# Patient Record
Sex: FEMALE | Race: WHITE | Employment: OTHER | URBAN - METROPOLITAN AREA
[De-identification: names, ages, dates, MRNs, and addresses within clinical notes are randomized per-mention and may not be internally consistent; named-entity substitution may affect disease eponyms.]

---

## 2017-11-15 NOTE — PROGRESS NOTES
and breath sounds normal. No respiratory distress. She has no wheezes. She has no rales. She exhibits no tenderness. Abdominal: Soft. Bowel sounds are normal. She exhibits no distension. There is no tenderness. There is no rebound and no guarding. Genitourinary: Vagina normal and uterus normal. No vaginal discharge found. Musculoskeletal: Normal range of motion. She exhibits no edema or tenderness. Neurological: She is alert and oriented to person, place, and time. Skin: Skin is warm and dry. No rash noted. No erythema. No pallor. Psychiatric: She has a normal mood and affect.  Her behavior is normal. Judgment and thought content normal.       Assessment:    well adult exam with pap   Screening mammogram         Plan:    Collect pap   BSE reviewed  Mammogram  Declines   Osteoporosis prevention reviewed   Dexascan declines   Preventive Health through PCP   RV prn/annual

## 2017-11-20 ENCOUNTER — OFFICE VISIT (OUTPATIENT)
Dept: OBGYN CLINIC | Age: 67
End: 2017-11-20
Payer: COMMERCIAL

## 2017-11-20 ENCOUNTER — HOSPITAL ENCOUNTER (OUTPATIENT)
Age: 67
Setting detail: SPECIMEN
Discharge: HOME | End: 2017-11-20
Payer: COMMERCIAL

## 2017-11-20 VITALS
WEIGHT: 134.25 LBS | BODY MASS INDEX: 21.57 KG/M2 | DIASTOLIC BLOOD PRESSURE: 64 MMHG | RESPIRATION RATE: 16 BRPM | SYSTOLIC BLOOD PRESSURE: 114 MMHG | HEIGHT: 66 IN

## 2017-11-20 DIAGNOSIS — Z01.419 PAP SMEAR, AS PART OF ROUTINE GYNECOLOGICAL EXAMINATION: Primary | ICD-10-CM

## 2017-11-20 PROCEDURE — 99396 PREV VISIT EST AGE 40-64: CPT | Performed by: NURSE PRACTITIONER

## 2017-11-20 RX ORDER — DIAZEPAM 2 MG/1
2 TABLET ORAL NIGHTLY PRN
Qty: 30 TABLET | Refills: 0 | Status: SHIPPED | OUTPATIENT
Start: 2017-11-20 | End: 2017-11-30

## 2017-11-20 ASSESSMENT — ENCOUNTER SYMPTOMS
DIARRHEA: 0
COUGH: 0
STRIDOR: 0
TROUBLE SWALLOWING: 0
ABDOMINAL PAIN: 0
BACK PAIN: 0
SINUS PRESSURE: 0
PHOTOPHOBIA: 0
VOMITING: 0
SHORTNESS OF BREATH: 0
SORE THROAT: 0
VOICE CHANGE: 0
NAUSEA: 0
CONSTIPATION: 0
ABDOMINAL DISTENTION: 0

## 2017-11-22 LAB
HPV SAMPLE: NORMAL
HPV SOURCE: NORMAL
HPV, GENOTYPE 16: NOT DETECTED
HPV, GENOTYPE 18: NOT DETECTED
HPV, HIGH RISK OTHER: NOT DETECTED
HPV, INTERPRETATION: NORMAL

## 2017-11-27 LAB — CYTOLOGY REPORT: NORMAL

## 2018-12-04 ENCOUNTER — HOSPITAL ENCOUNTER (OUTPATIENT)
Age: 68
Setting detail: SPECIMEN
Discharge: HOME | End: 2018-12-04
Payer: COMMERCIAL

## 2018-12-04 ENCOUNTER — OFFICE VISIT (OUTPATIENT)
Dept: OBGYN CLINIC | Age: 68
End: 2018-12-04
Payer: MEDICARE

## 2018-12-04 VITALS
RESPIRATION RATE: 16 BRPM | HEIGHT: 66 IN | WEIGHT: 134 LBS | BODY MASS INDEX: 21.53 KG/M2 | SYSTOLIC BLOOD PRESSURE: 124 MMHG | DIASTOLIC BLOOD PRESSURE: 68 MMHG

## 2018-12-04 DIAGNOSIS — F41.9 ANXIETY: ICD-10-CM

## 2018-12-04 DIAGNOSIS — Z01.419 PAP SMEAR, AS PART OF ROUTINE GYNECOLOGICAL EXAMINATION: Primary | ICD-10-CM

## 2018-12-04 PROCEDURE — G8484 FLU IMMUNIZE NO ADMIN: HCPCS | Performed by: NURSE PRACTITIONER

## 2018-12-04 PROCEDURE — 99213 OFFICE O/P EST LOW 20 MIN: CPT | Performed by: NURSE PRACTITIONER

## 2018-12-04 RX ORDER — INFLUENZA VACCINE, ADJUVANTED 15; 15; 15 UG/.5ML; UG/.5ML; UG/.5ML
INJECTION, SUSPENSION INTRAMUSCULAR
Refills: 0 | COMMUNITY
Start: 2018-09-29

## 2018-12-04 RX ORDER — DIAZEPAM 5 MG/1
TABLET ORAL
Qty: 30 TABLET | Refills: 0 | Status: SHIPPED | OUTPATIENT
Start: 2018-12-04 | End: 2019-12-04

## 2018-12-04 ASSESSMENT — PATIENT HEALTH QUESTIONNAIRE - PHQ9
1. LITTLE INTEREST OR PLEASURE IN DOING THINGS: 0
SUM OF ALL RESPONSES TO PHQ QUESTIONS 1-9: 0
SUM OF ALL RESPONSES TO PHQ9 QUESTIONS 1 & 2: 0
2. FEELING DOWN, DEPRESSED OR HOPELESS: 0
SUM OF ALL RESPONSES TO PHQ QUESTIONS 1-9: 0

## 2018-12-04 ASSESSMENT — ENCOUNTER SYMPTOMS
PHOTOPHOBIA: 0
VOMITING: 0
COLOR CHANGE: 0
STRIDOR: 0
WHEEZING: 0
TROUBLE SWALLOWING: 0
ABDOMINAL PAIN: 0
DIARRHEA: 0
COUGH: 0
BACK PAIN: 0
SHORTNESS OF BREATH: 0
ABDOMINAL DISTENTION: 0
SORE THROAT: 0
CONSTIPATION: 0
NAUSEA: 0

## 2018-12-24 LAB — CYTOLOGY REPORT: NORMAL

## 2022-08-25 NOTE — DISCHARGE INSTRUCTIONS
You have had lesions removed today under local anesthesia. You may have some pain at the surgery site after the procedure. You should avoid aspirin products and over the counter products  for 3 days. Keep areas clean and dry. No strenuous activity for 24  HOURS     No swimming, no tub baths,no hot tubs    Eat lightly at first, advance diet as tolerated. Drink plenty of fluids. Keep it covered with a sterile bandage until follow up appointment     Steri-strips  will fall off on their own in about a week. You may shower 24 hours after the procedure. May use shampoo . Gently wash over scalp incision    Pat the wound dry after you have washed it with a mild soap. Do not submerge the wound in water until it is well-healed. Take pain medication as directed, if necessary per instructions. Complete ALL antibiotics as directed for entire duration of therapy. Stitches will be left in the skin until your follow up appointment.      Call Your Doctor if any of the following occurs:     Signs of infection, including fever and chills   Redness, swelling, increasing pain, excessive bleeding, or discharge from the incision site   Pain that you cannot control with the medicines you have been given   Any new symptoms

## 2022-08-31 ENCOUNTER — HOSPITAL ENCOUNTER (OUTPATIENT)
Age: 72
Setting detail: OUTPATIENT SURGERY
Discharge: HOME | End: 2022-08-31
Attending: PLASTIC SURGERY | Admitting: PLASTIC SURGERY
Payer: MEDICARE

## 2022-08-31 VITALS
SYSTOLIC BLOOD PRESSURE: 115 MMHG | BODY MASS INDEX: 21.49 KG/M2 | WEIGHT: 129 LBS | DIASTOLIC BLOOD PRESSURE: 80 MMHG | HEART RATE: 53 BPM | TEMPERATURE: 98.2 F | HEIGHT: 65 IN | OXYGEN SATURATION: 99 %

## 2022-08-31 DIAGNOSIS — R22.2 MASS OF SKIN OF CHEST: ICD-10-CM

## 2022-08-31 DIAGNOSIS — R22.0 SCALP MASS: ICD-10-CM

## 2022-08-31 DIAGNOSIS — R22.2 MASS OF SKIN OF ABDOMEN: ICD-10-CM

## 2022-08-31 PROCEDURE — 3600000012 HC SURGERY LEVEL 2 ADDTL 15MIN: Performed by: PLASTIC SURGERY

## 2022-08-31 PROCEDURE — 7100000010 HC PHASE II RECOVERY - FIRST 15 MIN: Performed by: PLASTIC SURGERY

## 2022-08-31 PROCEDURE — 2709999900 HC NON-CHARGEABLE SUPPLY: Performed by: PLASTIC SURGERY

## 2022-08-31 PROCEDURE — 2500000003 HC RX 250 WO HCPCS: Performed by: PLASTIC SURGERY

## 2022-08-31 PROCEDURE — 3600000002 HC SURGERY LEVEL 2 BASE: Performed by: PLASTIC SURGERY

## 2022-08-31 PROCEDURE — 88304 TISSUE EXAM BY PATHOLOGIST: CPT

## 2022-08-31 PROCEDURE — 7100000011 HC PHASE II RECOVERY - ADDTL 15 MIN: Performed by: PLASTIC SURGERY

## 2022-08-31 RX ORDER — LIDOCAINE HYDROCHLORIDE AND EPINEPHRINE 10; 10 MG/ML; UG/ML
INJECTION, SOLUTION INFILTRATION; PERINEURAL
Status: DISCONTINUED
Start: 2022-08-31 | End: 2022-08-31 | Stop reason: HOSPADM

## 2022-08-31 ASSESSMENT — PAIN - FUNCTIONAL ASSESSMENT: PAIN_FUNCTIONAL_ASSESSMENT: 0-10

## 2022-08-31 NOTE — BRIEF OP NOTE
Brief Postoperative Note      Patient: Danii Sainz  YOB: 1950  MRN: [id number]    Date of Procedure: 8/31/2022    Pre-Op Diagnosis: MASS - SCALP, CHEST AND LEFT ABDOMEN R22.2    Post-Op Diagnosis: Same       Procedure(s):  SCALP, CHEST AND LEFT ABDOMEN LESION  BIOPSY EXCISION    Surgeon(s):  Justina Lauren MD    Assistant:  * No surgical staff found *    Anesthesia: Local    Estimated Blood Loss (mL): Minimal    Complications: None    Specimens:   ID Type Source Tests Collected by Time Destination   A : Scalp lesion Tissue Tissue SURGICAL PATHOLOGY Justina Lauren MD 8/31/2022 1251    B : chest lesion Tissue Tissue SURGICAL PATHOLOGY Justina Lauren MD 8/31/2022 1252    C : left abdomen lesion Tissue Tissue SURGICAL PATHOLOGY Justina Lauren MD 8/31/2022 1252        Implants:  * No implants in log *      Drains: * No LDAs found *    Findings:     Electronically signed by Justina Lauren MD on 8/31/2022 at 1:17 PM

## 2022-08-31 NOTE — H&P
Subjective:      Patient ID: Nita Lugo is a 73 y.o. female. HPI  Patient is here today for cysts on the scalp, one on the chest and one on the left side of the abdomen. Medical History    Medical History    No past medical history on file. Other Medical History    Diagnosis Date Comment Source   Tobacco abuse           Family History    No family history on file. Social History    Tobacco History    Smoking Status   Former Smoking Tobacco Type   Cigarettes   Smokeless Tobacco Use   Never     Alcohol History    Alcohol Use Status   Yes     Drug Use    Drug Use Status   No     Sexual Activity    Sexually Active   Not Asked    Surgical History    No past surgical history on file. E-cigarette/Vaping    Questions Responses   E-cigarette/Vaping Use    Start Date    Passive Exposure    Quit Date    Counseling Given    Comments      Socioeconomic History    Employment History    No employment history on file. Family and Education    Marital Status        Social Identity    Preferred Language Ethnicity Race   English Non- / Non  White (non-)           No current facility-administered medications on file prior to encounter. Current Outpatient Medications on File Prior to Encounter   Medication Sig Dispense Refill    cephALEXin (KEFLEX) 250 MG capsule Take one PO QID. Start day before surgery. 12 capsule 0    FLUAD 0.5 ML NIGEL inject 0.5 milliliter intramuscularly  0    vitamin D (ERGOCALCIFEROL) 53870 UNITS CAPS capsule Take 50,000 Units by mouth once a week. calcium carbonate 600 MG TABS tablet Take 1 tablet by mouth daily. Objective:   Physical Exam  Vitals and nursing note reviewed. Exam conducted with a chaperone present. Constitutional:       Appearance: Normal appearance. HENT:      Head: Normocephalic and atraumatic.       Mouth/Throat:      Mouth: Mucous membranes are moist.   Eyes:      Extraocular Movements: Extraocular movements intact. Conjunctiva/sclera: Conjunctivae normal.      Pupils: Pupils are equal, round, and reactive to light. Pulmonary:      Effort: Pulmonary effort is normal.   Chest:      Abdominal:      Skin:     General: Skin is warm and dry. Neurological:      General: No focal deficit present. Mental Status: She is alert and oriented to person, place, and time. Assessment:   Mass x3. Plan:   Scheduled for excision x3. I have discussed with the patient the indication, alternatives, and the possible risks and/or complications which include but are not limited to those delineated on the consent form for the planned procedure and the anesthesia methods. All questions were answered to patient's satisfaction. Consent was reviewed and signed.

## 2022-09-02 LAB — DERMATOLOGY PATHOLOGY REPORT: NORMAL

## 2022-09-04 NOTE — OP NOTE
for  hemostasis. Wounds closed in layers with interrupted 4-0 Vicryl in the  subcutaneous and running intracuticular 4-0 Prolene to the skin of the  chest and the abdomen. For the scalp, 3-0 Vicryl was used and  3-0 Prolene. The patient tolerated procedure well. Blood loss minimal.  Specimens x3. Complications none. The patient was transferred to  recovery in stable condition.       Delia Perez    D: 09/03/2022 20:29:16       T: 09/03/2022 20:31:36     [id number]  Job#: [id number]     Doc#: [id number]    CC:

## 2024-01-15 ENCOUNTER — OFFICE VISIT (OUTPATIENT)
Dept: PODIATRY | Age: 74
End: 2024-01-15
Payer: MEDICARE

## 2024-01-15 VITALS — WEIGHT: 129 LBS | BODY MASS INDEX: 21.49 KG/M2 | HEIGHT: 65 IN

## 2024-01-15 DIAGNOSIS — M79.671 RIGHT FOOT PAIN: Primary | ICD-10-CM

## 2024-01-15 PROCEDURE — 99204 OFFICE O/P NEW MOD 45 MIN: CPT | Performed by: PODIATRIST

## 2024-01-15 PROCEDURE — G8399 PT W/DXA RESULTS DOCUMENT: HCPCS | Performed by: PODIATRIST

## 2024-01-15 PROCEDURE — 3017F COLORECTAL CA SCREEN DOC REV: CPT | Performed by: PODIATRIST

## 2024-01-15 PROCEDURE — 1036F TOBACCO NON-USER: CPT | Performed by: PODIATRIST

## 2024-01-15 PROCEDURE — G8427 DOCREV CUR MEDS BY ELIG CLIN: HCPCS | Performed by: PODIATRIST

## 2024-01-15 PROCEDURE — 1090F PRES/ABSN URINE INCON ASSESS: CPT | Performed by: PODIATRIST

## 2024-01-15 PROCEDURE — G8420 CALC BMI NORM PARAMETERS: HCPCS | Performed by: PODIATRIST

## 2024-01-15 PROCEDURE — G8484 FLU IMMUNIZE NO ADMIN: HCPCS | Performed by: PODIATRIST

## 2024-01-15 PROCEDURE — 1123F ACP DISCUSS/DSCN MKR DOCD: CPT | Performed by: PODIATRIST

## 2024-01-15 NOTE — PROGRESS NOTES
[facility] PHYSICIANS [facility] PODIATRY  [address] [place], [place] [address]  Dept: [phone]    NEW PATIENT PROGRESS NOTE  Date of patient's visit: 1/15/2024  Patient's Name:  Milena Garza YOB: 1950            Patient Care Team:  John Guerra MD as PCP - General (Family Medicine)  Sameera Howard APRN - CNP as Nurse Practitioner (Nurse Practitioner)        Chief Complaint   Patient presents with    New Patient     Establish care    Bunions     Right foot x 1 year         HPI:   Milena Garza is a 73 y.o. female who presents to the office today complaining of right foot bunion.  Symptoms began 1 year(s) ago. Patient relates pain is Absent .  Pain is rated 0 out of 10 and is described as none.  Treatments prior to today's visit include: new shoes that helped.  She doesn't want the bunion to get worse but it does not cause her much pain unless she is in the wrong shoes .  Currently denies F/C/N/V. Pt's primary care physician is John Guerra MD last seen November 9 2023     Allergies   Allergen Reactions    Other      Bee Stings, Nickel        Past Medical History:   Diagnosis Date    Tobacco abuse        Prior to Admission medications    Medication Sig Start Date End Date Taking? Authorizing Provider   cephALEXin (KEFLEX) 250 MG capsule Take one PO QID. Start day before surgery. 8/18/22  Yes Mariela Son MD   vitamin D (ERGOCALCIFEROL) 99077 UNITS CAPS capsule Take 1 capsule by mouth once a week   Yes Kristine Santos MD   calcium carbonate 600 MG TABS tablet Take 1 tablet by mouth daily   Yes Kristine Santos MD   FLUAD 0.5 ML NIGEL inject 0.5 milliliter intramuscularly  Patient not taking: Reported on 8/31/2022 9/29/18   Kristine Santos MD       Past Surgical History:   Procedure Laterality Date    PRE-MALIGNANT / BENIGN SKIN LESION EXCISION  08/31/2022    SKIN BIOPSY N/A 8/31/2022    SCALP, CHEST AND LEFT ABDOMEN

## 2024-02-12 ENCOUNTER — OFFICE VISIT (OUTPATIENT)
Dept: PODIATRY | Age: 74
End: 2024-02-12
Payer: MEDICARE

## 2024-02-12 VITALS — BODY MASS INDEX: 21.49 KG/M2 | HEIGHT: 65 IN | WEIGHT: 129 LBS

## 2024-02-12 DIAGNOSIS — M79.671 RIGHT FOOT PAIN: Primary | ICD-10-CM

## 2024-02-12 PROCEDURE — G8484 FLU IMMUNIZE NO ADMIN: HCPCS | Performed by: PODIATRIST

## 2024-02-12 PROCEDURE — 1036F TOBACCO NON-USER: CPT | Performed by: PODIATRIST

## 2024-02-12 PROCEDURE — 1123F ACP DISCUSS/DSCN MKR DOCD: CPT | Performed by: PODIATRIST

## 2024-02-12 PROCEDURE — G8427 DOCREV CUR MEDS BY ELIG CLIN: HCPCS | Performed by: PODIATRIST

## 2024-02-12 PROCEDURE — G8399 PT W/DXA RESULTS DOCUMENT: HCPCS | Performed by: PODIATRIST

## 2024-02-12 PROCEDURE — 99213 OFFICE O/P EST LOW 20 MIN: CPT | Performed by: PODIATRIST

## 2024-02-12 PROCEDURE — 1090F PRES/ABSN URINE INCON ASSESS: CPT | Performed by: PODIATRIST

## 2024-02-12 PROCEDURE — 3017F COLORECTAL CA SCREEN DOC REV: CPT | Performed by: PODIATRIST

## 2024-02-12 PROCEDURE — G8420 CALC BMI NORM PARAMETERS: HCPCS | Performed by: PODIATRIST

## 2024-02-12 NOTE — PROGRESS NOTES
[facility] PHYSICIANS [facility] PODIATRY  [address] [place], [place] [address]  Dept: [phone]    RETURN PATIENT PROGRESS NOTE  Date of patient's visit: 2/12/2024  Patient's Name:  Milena Garza YOB: 1950            Patient Care Team:  John Guerra MD as PCP - General (Family Medicine)  Sameera Howard APRN - CNP as Nurse Practitioner (Nurse Practitioner)       Milena Garza 73 y.o. female that presents for follow-up of   Chief Complaint   Patient presents with    Bunions     Right foot     Pt's primary care physician is John Guerra MD last seen November 9 2023  Symptoms began 1 year(s) ago and are decreased .  Patient relates pain is Absent .  Pain is rated 0 out of 10 and is described as none.  Treatments prior to today's visit include: previous podiatry treatment.  Currently denies F/C/N/V.     Allergies   Allergen Reactions    Other      Bee Stings, Nickel        Past Medical History:   Diagnosis Date    Tobacco abuse        Prior to Admission medications    Medication Sig Start Date End Date Taking? Authorizing Provider   cephALEXin (KEFLEX) 250 MG capsule Take one PO QID. Start day before surgery.  Patient not taking: Reported on 1/15/2024 8/18/22   Mariela Son MD   FLUAD 0.5 ML NIGEL inject 0.5 milliliter intramuscularly  Patient not taking: Reported on 8/31/2022 9/29/18   Kristine Santos MD   vitamin D (ERGOCALCIFEROL) 17762 UNITS CAPS capsule Take 1 capsule by mouth once a week    Kristine Santos MD   calcium carbonate 600 MG TABS tablet Take 1 tablet by mouth daily    Kristine Santos MD       Review of Systems    Review of Systems:  History obtained from chart review and the patient  General ROS: negative for - chills, fatigue, fever, night sweats or weight gain  Constitutional: Negative for chills, diaphoresis, fatigue, fever and unexpected weight change.  Musculoskeletal: Positive for arthralgias,

## 2024-10-03 ENCOUNTER — HOSPITAL ENCOUNTER (OUTPATIENT)
Dept: PREADMISSION TESTING | Age: 74
Discharge: HOME | End: 2024-10-07

## 2024-10-03 VITALS
HEART RATE: 71 BPM | WEIGHT: 126 LBS | SYSTOLIC BLOOD PRESSURE: 127 MMHG | HEIGHT: 63 IN | BODY MASS INDEX: 22.32 KG/M2 | DIASTOLIC BLOOD PRESSURE: 65 MMHG | RESPIRATION RATE: 16 BRPM | OXYGEN SATURATION: 95 %

## 2024-10-03 NOTE — DISCHARGE INSTRUCTIONS
DAY OF SURGERY/PROCEDURE  GUIDELINES    As a patient at the [facility], you can expect quality medical and nursing care that is centered on your individual needs. It is our goal to make your surgical experience as comfortable and excellent as possible.  ________________________________________________________________________    The following instructions are general guidelines, if any information on this sheet is different from what your doctor has instructed you to do, please follow your doctor's instructions.    Please arrive on 10/15 @ 615am      Enter through entrance C. Check in at registration     Upon arrival you will be taken to the pre-operative area to get ready for surgery, your family will stay in the waiting room and visit with you once you are ready for surgery. Due to special limitations please limit visitation to 1-2 members of your family at a time. When it is time for surgery your family will return to the waiting room.    Nothing to eat, drink, smoke, suck or chew after midnight (no water, gum, mints, cigarettes, cigars, pipes, snuff, chewing tobacco, etc.) or your surgery may be canceled.     Take a shower or bath on the morning of your surgery/procedure (Hibiclens if directed) Do not apply any lotions.    Brush your teeth, but do not swallow any water    IN CASE OF ILLNESS - If you have a cold or flu symptoms (high fever, runny nose, sore throat, cough, etc.) rash, nausea, vomiting, loose stools, and/or recent contact with someone who has a contagious disease (chick pox, measles, etc.) please call your doctor before coming to the surgery center    Take a small sip of water with heart, blood pressure, and/or seizure medication the morning of surgery.     If applicable bring your:  Inhaler (s)  Hearing aid(s)  Eyeglasses and Case (If you wear contacts they have to be removed before surgery, bring case and solution)    DO NOT take anticoagulants (blood thinners, aspirin or

## 2024-10-07 LAB
EKG ATRIAL RATE: 66 BPM
EKG P AXIS: 84 DEGREES
EKG P-R INTERVAL: 154 MS
EKG Q-T INTERVAL: 406 MS
EKG QRS DURATION: 86 MS
EKG QTC CALCULATION (BAZETT): 425 MS
EKG R AXIS: 89 DEGREES
EKG T AXIS: 80 DEGREES
EKG VENTRICULAR RATE: 66 BPM

## 2024-10-08 NOTE — DISCHARGE INSTRUCTIONS
Activity  You have had anesthesia today  Do not drive, operate heavy equipment, consume alcoholic beverages, or make any important decisions  for 24 hours   If you are taking pain medication: Do not drive or consume alcohol.  Take your time changing positions today. You may feel light headed or dizzy if you move too quickly.   Continue your home medications as ordered by your physician.  Diet   You can eat your normal diet when you feel well. You should start off with bland foods like chicken soup, toast, or yogurt. Then advance as tolerated.  Drink plenty of fluids (unless your doctor tells you not to). Your urine should be very lightly colored without a strong odor.       You may have some pain at the surgery site after the procedure.     You should avoid aspirin products and over the counter products  for 3 days.     Keep areas clean and dry.     No strenuous activity for 24  HOURS     No swimming, no tub baths,no hot tubs    Eat lightly at first, advance diet as tolerated. Drink plenty of fluids.    Keep it covered with a sterile bandage until follow up appointment     Steri-strips  will fall off on their own in about a week.    You may shower 24 hours after the procedure.    Pat the wound dry after you have washed it with a mild soap.    Do not submerge the wound in water until it is well-healed.      Take pain medication as directed, if necessary per instructions.     Complete ALL antibiotics as directed for entire duration of therapy.     Stitches will be left in the skin until your follow up appointment.     Call Your Doctor if any of the following occurs:     Signs of infection, including fever and chills   Redness, swelling, increasing pain, excessive bleeding, or discharge from the incision site   Pain that you cannot control with the medicines you have been given   Any new symptoms

## 2024-10-14 ENCOUNTER — ANESTHESIA EVENT (OUTPATIENT)
Dept: OPERATING ROOM | Age: 74
End: 2024-10-14
Payer: MEDICARE

## 2024-10-15 ENCOUNTER — HOSPITAL ENCOUNTER (OUTPATIENT)
Age: 74
Setting detail: OUTPATIENT SURGERY
Discharge: HOME | End: 2024-10-15
Attending: PLASTIC SURGERY | Admitting: PLASTIC SURGERY
Payer: MEDICARE

## 2024-10-15 ENCOUNTER — ANESTHESIA (OUTPATIENT)
Dept: OPERATING ROOM | Age: 74
End: 2024-10-15
Payer: MEDICARE

## 2024-10-15 VITALS
OXYGEN SATURATION: 94 % | HEIGHT: 63 IN | BODY MASS INDEX: 22.32 KG/M2 | SYSTOLIC BLOOD PRESSURE: 119 MMHG | RESPIRATION RATE: 13 BRPM | DIASTOLIC BLOOD PRESSURE: 69 MMHG | TEMPERATURE: 97.2 F | HEART RATE: 54 BPM | WEIGHT: 126 LBS

## 2024-10-15 DIAGNOSIS — D48.5 NEOPLASM OF UNCERTAIN BEHAVIOR OF SKIN: ICD-10-CM

## 2024-10-15 PROCEDURE — 6360000002 HC RX W HCPCS: Performed by: NURSE ANESTHETIST, CERTIFIED REGISTERED

## 2024-10-15 PROCEDURE — 88305 TISSUE EXAM BY PATHOLOGIST: CPT

## 2024-10-15 PROCEDURE — 7100000011 HC PHASE II RECOVERY - ADDTL 15 MIN: Performed by: PLASTIC SURGERY

## 2024-10-15 PROCEDURE — 7100000010 HC PHASE II RECOVERY - FIRST 15 MIN: Performed by: PLASTIC SURGERY

## 2024-10-15 PROCEDURE — 3700000001 HC ADD 15 MINUTES (ANESTHESIA): Performed by: PLASTIC SURGERY

## 2024-10-15 PROCEDURE — 7100000001 HC PACU RECOVERY - ADDTL 15 MIN: Performed by: PLASTIC SURGERY

## 2024-10-15 PROCEDURE — 2500000003 HC RX 250 WO HCPCS: Performed by: PLASTIC SURGERY

## 2024-10-15 PROCEDURE — 3700000000 HC ANESTHESIA ATTENDED CARE: Performed by: PLASTIC SURGERY

## 2024-10-15 PROCEDURE — 7100000000 HC PACU RECOVERY - FIRST 15 MIN: Performed by: PLASTIC SURGERY

## 2024-10-15 PROCEDURE — 2709999900 HC NON-CHARGEABLE SUPPLY: Performed by: PLASTIC SURGERY

## 2024-10-15 PROCEDURE — 3600000002 HC SURGERY LEVEL 2 BASE: Performed by: PLASTIC SURGERY

## 2024-10-15 PROCEDURE — 2500000003 HC RX 250 WO HCPCS: Performed by: NURSE ANESTHETIST, CERTIFIED REGISTERED

## 2024-10-15 PROCEDURE — 3600000012 HC SURGERY LEVEL 2 ADDTL 15MIN: Performed by: PLASTIC SURGERY

## 2024-10-15 RX ORDER — LABETALOL HYDROCHLORIDE 5 MG/ML
10 INJECTION, SOLUTION INTRAVENOUS
Status: DISCONTINUED | OUTPATIENT
Start: 2024-10-15 | End: 2024-10-15 | Stop reason: HOSPADM

## 2024-10-15 RX ORDER — ONDANSETRON 2 MG/ML
INJECTION INTRAMUSCULAR; INTRAVENOUS
Status: DISCONTINUED | OUTPATIENT
Start: 2024-10-15 | End: 2024-10-15 | Stop reason: SDUPTHER

## 2024-10-15 RX ORDER — BUPIVACAINE HYDROCHLORIDE AND EPINEPHRINE 5; 5 MG/ML; UG/ML
INJECTION, SOLUTION PERINEURAL
Status: DISCONTINUED
Start: 2024-10-15 | End: 2024-10-15 | Stop reason: HOSPADM

## 2024-10-15 RX ORDER — SODIUM CHLORIDE 0.9 % (FLUSH) 0.9 %
5-40 SYRINGE (ML) INJECTION PRN
Status: DISCONTINUED | OUTPATIENT
Start: 2024-10-15 | End: 2024-10-15 | Stop reason: HOSPADM

## 2024-10-15 RX ORDER — MIDAZOLAM HYDROCHLORIDE 1 MG/ML
INJECTION INTRAMUSCULAR; INTRAVENOUS
Status: DISCONTINUED | OUTPATIENT
Start: 2024-10-15 | End: 2024-10-15 | Stop reason: SDUPTHER

## 2024-10-15 RX ORDER — HYDRALAZINE HYDROCHLORIDE 20 MG/ML
10 INJECTION INTRAMUSCULAR; INTRAVENOUS
Status: DISCONTINUED | OUTPATIENT
Start: 2024-10-15 | End: 2024-10-15 | Stop reason: HOSPADM

## 2024-10-15 RX ORDER — DEXAMETHASONE SODIUM PHOSPHATE 10 MG/ML
INJECTION, SOLUTION INTRAMUSCULAR; INTRAVENOUS
Status: DISCONTINUED | OUTPATIENT
Start: 2024-10-15 | End: 2024-10-15 | Stop reason: SDUPTHER

## 2024-10-15 RX ORDER — NALOXONE HYDROCHLORIDE 0.4 MG/ML
INJECTION, SOLUTION INTRAMUSCULAR; INTRAVENOUS; SUBCUTANEOUS PRN
Status: DISCONTINUED | OUTPATIENT
Start: 2024-10-15 | End: 2024-10-15 | Stop reason: HOSPADM

## 2024-10-15 RX ORDER — SODIUM CHLORIDE, SODIUM LACTATE, POTASSIUM CHLORIDE, CALCIUM CHLORIDE 600; 310; 30; 20 MG/100ML; MG/100ML; MG/100ML; MG/100ML
INJECTION, SOLUTION INTRAVENOUS CONTINUOUS
Status: DISCONTINUED | OUTPATIENT
Start: 2024-10-15 | End: 2024-10-15 | Stop reason: HOSPADM

## 2024-10-15 RX ORDER — FENTANYL CITRATE 50 UG/ML
INJECTION, SOLUTION INTRAMUSCULAR; INTRAVENOUS
Status: DISCONTINUED | OUTPATIENT
Start: 2024-10-15 | End: 2024-10-15 | Stop reason: SDUPTHER

## 2024-10-15 RX ORDER — LIDOCAINE HYDROCHLORIDE 10 MG/ML
INJECTION, SOLUTION EPIDURAL; INFILTRATION; INTRACAUDAL; PERINEURAL
Status: DISCONTINUED | OUTPATIENT
Start: 2024-10-15 | End: 2024-10-15 | Stop reason: SDUPTHER

## 2024-10-15 RX ORDER — PROPOFOL 10 MG/ML
INJECTION, EMULSION INTRAVENOUS
Status: DISCONTINUED | OUTPATIENT
Start: 2024-10-15 | End: 2024-10-15 | Stop reason: SDUPTHER

## 2024-10-15 RX ORDER — LIDOCAINE HYDROCHLORIDE 10 MG/ML
1 INJECTION, SOLUTION EPIDURAL; INFILTRATION; INTRACAUDAL; PERINEURAL
Status: DISCONTINUED | OUTPATIENT
Start: 2024-10-15 | End: 2024-10-15 | Stop reason: HOSPADM

## 2024-10-15 RX ORDER — SODIUM CHLORIDE 0.9 % (FLUSH) 0.9 %
5-40 SYRINGE (ML) INJECTION EVERY 12 HOURS SCHEDULED
Status: DISCONTINUED | OUTPATIENT
Start: 2024-10-15 | End: 2024-10-15 | Stop reason: HOSPADM

## 2024-10-15 RX ORDER — CEPHALEXIN 500 MG/1
500 CAPSULE ORAL 3 TIMES DAILY
Qty: 15 CAPSULE | Refills: 0 | Status: SHIPPED | OUTPATIENT
Start: 2024-10-15 | End: 2024-10-20

## 2024-10-15 RX ORDER — SODIUM CHLORIDE 9 MG/ML
INJECTION, SOLUTION INTRAVENOUS PRN
Status: DISCONTINUED | OUTPATIENT
Start: 2024-10-15 | End: 2024-10-15 | Stop reason: HOSPADM

## 2024-10-15 RX ORDER — BUPIVACAINE HYDROCHLORIDE AND EPINEPHRINE 5; 5 MG/ML; UG/ML
INJECTION, SOLUTION PERINEURAL PRN
Status: DISCONTINUED | OUTPATIENT
Start: 2024-10-15 | End: 2024-10-15 | Stop reason: ALTCHOICE

## 2024-10-15 RX ORDER — PROCHLORPERAZINE EDISYLATE 5 MG/ML
5 INJECTION INTRAMUSCULAR; INTRAVENOUS
Status: DISCONTINUED | OUTPATIENT
Start: 2024-10-15 | End: 2024-10-15 | Stop reason: HOSPADM

## 2024-10-15 RX ADMIN — LIDOCAINE HYDROCHLORIDE 50 MG: 10 INJECTION, SOLUTION EPIDURAL; INFILTRATION; INTRACAUDAL; PERINEURAL at 07:35

## 2024-10-15 RX ADMIN — MIDAZOLAM 2 MG: 1 INJECTION INTRAMUSCULAR; INTRAVENOUS at 07:35

## 2024-10-15 RX ADMIN — DEXAMETHASONE SODIUM PHOSPHATE 10 MG: 10 INJECTION, SOLUTION INTRAMUSCULAR; INTRAVENOUS at 07:38

## 2024-10-15 RX ADMIN — Medication 2 G: at 07:37

## 2024-10-15 RX ADMIN — ONDANSETRON 4 MG: 2 INJECTION INTRAMUSCULAR; INTRAVENOUS at 07:41

## 2024-10-15 RX ADMIN — FENTANYL CITRATE 50 MCG: 50 INJECTION, SOLUTION INTRAMUSCULAR; INTRAVENOUS at 07:35

## 2024-10-15 RX ADMIN — PROPOFOL 150 MG: 10 INJECTION, EMULSION INTRAVENOUS at 07:35

## 2024-10-15 ASSESSMENT — PAIN - FUNCTIONAL ASSESSMENT
PAIN_FUNCTIONAL_ASSESSMENT: 0-10
PAIN_FUNCTIONAL_ASSESSMENT: NONE - DENIES PAIN

## 2024-10-15 NOTE — OP NOTE
Operative Note      Patient: Milena Garza  YOB: 1950  MRN: [id number]    Date of Procedure: 10/15/2024    Pre-Op Diagnosis Codes:      * Neoplasm of uncertain behavior of skin [D48.5]    Post-Op Diagnosis: Same       Procedure(s):  EXCISION LESION ANTERIOR NECK -4 cm lesion with 5.4 cm linear closure    Surgeon(s):  Pablito Kahn MD    Assistant:   * No surgical staff found *    Anesthesia: General    Estimated Blood Loss (mL): Minimal    Complications: None    Specimens:   ID Type Source Tests Collected by Time Destination   A : LESION ANTERIOR NECK Tissue Tissue SURGICAL PATHOLOGY Pablito Kahn MD 10/15/2024 0744        Implants:  * No implants in log *      Drains: * No LDAs found *    Findings:  Infection Present At Time Of Surgery (PATOS) (choose all levels that have infection present):  No infection present  Other Findings: Excision measured 2.5 cm x 5.1 cm measured design  This procedure was not performed to treat primary cutaneous melanoma through wide local excision    Detailed Description of Procedure:   With the patient by general anesthesia dyspnea posterior pharyngeal LMA intubation this excoriated lesion on the right paramedian anterior neck was designed for excision.  2 mm clinically free margins were marked and the ellipse was measured at 2.5 x 5.1 cm.  After sterile prep and drape and appropriate timeout this area was injected with half percent Marcaine 1-200,000 epinephrine local.  It was incised as designed with dissection into the subcutaneous tissue.  The entire lesion was removed and Bovie electrocautery hemostasis was performed.  Multilayer closure with 3-0 Monocryl was performed with excellent approximation paralleling relaxed skin tension lines.  Steri-Strips were applied.  She went to recovery in excellent condition, no complication and needle and sponge count correct at the end of the case.    Electronically signed by Pablito Kahn MD on

## 2024-10-15 NOTE — ANESTHESIA PRE PROCEDURE
Department of Anesthesiology  Preprocedure Note       Name:  Milena Garza   Age:  73 y.o.  :  1950                                          MRN:  [id number]         Date:  10/15/2024      Surgeon: Surgeon(s):  Pablito Kahn MD    Procedure: Procedure(s):  ANTERIOR NECK LESION 2cm BIOPSY EXCISION    Medications prior to admission:   Prior to Admission medications    Medication Sig Start Date End Date Taking? Authorizing Provider   aspirin-acetaminophen-caffeine (EXCEDRIN MIGRAINE) 250-250-65 MG per tablet Take 1 tablet by mouth every 6 hours as needed for Headaches    ProviderKristine MD   fluorouracil (EFUDEX) 5 % cream Apply topically 2 times daily. 24   Elizabeth Burrows, APRN - CNP   vitamin D (ERGOCALCIFEROL) 78400 UNITS CAPS capsule Take 1 capsule by mouth Twice a Week    ProviderKristine MD   calcium carbonate 600 MG TABS tablet Take 1 tablet by mouth daily    ProviderKristine MD       Current medications:    Current Facility-Administered Medications   Medication Dose Route Frequency Provider Last Rate Last Admin    ceFAZolin 2000 mg in 20 mL SWFI IV Syringe IV syringe             lidocaine PF 1 % injection 1 mL  1 mL IntraDERmal Once PRN Obi Alna MD        lactated ringers IV soln infusion   IntraVENous Continuous Obi Alan MD        sodium chloride flush 0.9 % injection 5-40 mL  5-40 mL IntraVENous 2 times per day Obi Alan MD        sodium chloride flush 0.9 % injection 5-40 mL  5-40 mL IntraVENous PRN Obi Alan MD        0.9 % sodium chloride infusion   IntraVENous PRN Obi Alan MD           Allergies:    Allergies   Allergen Reactions    Other      Bee Stings, Nickel        Problem List:    Patient Active Problem List   Diagnosis Code    Tobacco abuse Z72.0       Past Medical History:        Diagnosis Date    Tobacco abuse        Past Surgical History:        Procedure Laterality Date    OVARY REMOVAL      SKIN BIOPSY N/A 2022
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
## 2024-10-15 NOTE — ANESTHESIA POSTPROCEDURE EVALUATION
Department of Anesthesiology  Postprocedure Note    Patient: Milena Garza  MRN: [id number]  YOB: 1950  Date of evaluation: 10/15/2024    Procedure Summary       Date: 10/15/24 Room / Location: [address]    Anesthesia Start: 0732 Anesthesia Stop: 0802    Procedure: EXCISION LESION ANTERIOR NECK (Neck) Diagnosis:       Neoplasm of uncertain behavior of skin      (Neoplasm of uncertain behavior of skin [D48.5])    Surgeons: Pablito Kahn MD Responsible Provider: Obi Alan MD    Anesthesia Type: general ASA Status: 2            Anesthesia Type: No value filed.    Chely Phase I: Chely Score: 9    Chely Phase II: Chely Score: 10    Anesthesia Post Evaluation    Patient location during evaluation: PACU  Patient participation: complete - patient participated  Level of consciousness: awake and awake and alert  Pain score: 1  Nausea & Vomiting: no nausea and no vomiting  Cardiovascular status: hemodynamically stable and blood pressure returned to baseline  Respiratory status: acceptable  Hydration status: euvolemic  Multimodal analgesia pain management approach  Pain management: adequate    No notable events documented.

## 2024-10-16 LAB — SURGICAL PATHOLOGY REPORT: NORMAL

## (undated) DEVICE — SOLUTION SCRB 4OZ 7.5% POVIDONE IOD ANTIMIC BTL

## (undated) DEVICE — MHPB GEN MINOR PACK: Brand: MEDLINE INDUSTRIES, INC.

## (undated) DEVICE — SOLUTION IRRIG 1000ML 0.9% SOD CHL USP POUR PLAS BTL

## (undated) DEVICE — SUTURE PROL SZ 3-0 L18IN NONABSORBABLE BLU L24MM FS-1 3/8 8684G

## (undated) DEVICE — PREMIUM DRY TRAY LF: Brand: MEDLINE INDUSTRIES, INC.

## (undated) DEVICE — SUTURE PROL SZ 4-0 L18IN NONABSORBABLE BLU L16MM PC-3 3/8 8634G

## (undated) DEVICE — GLOVE ORANGE PI 7 1/2   MSG9075

## (undated) DEVICE — SUTURE VCRL SZ 4-0 L18IN ABSRB UD L13MM P-3 3/8 CIR PRIM J494H

## (undated) DEVICE — 3M™ STERI-STRIP™ BLEND TONE SKIN CLOSURES, B1551, TAN, 1/4 IN X 3 IN (6 MM X 75 MM), 3 STRIPS/ENVELOPE: Brand: 3M™ STERI-STRIP™

## (undated) DEVICE — ELECTRODE PT RET AD L9FT HI MOIST COND ADH HYDRGEL CORDED

## (undated) DEVICE — SOLUTION IV IRRIG POUR BRL 0.9% SODIUM CHL 2F7124

## (undated) DEVICE — SUTURE VCRL SZ 3-0 L18IN ABSRB UD PS-2 L19MM 1/2 CIR J497G

## (undated) DEVICE — MASTISOL ADHESIVE LIQ 2/3ML

## (undated) DEVICE — MHPB HEAD AND NECK  PACK: Brand: MEDLINE INDUSTRIES, INC.

## (undated) DEVICE — SUTURE MONOCRYL SZ 3 0 L18IN ABSRB UD PS 2 3 8 CIR REV CUT NDL MCP497G

## (undated) DEVICE — STRIP,CLOSURE,WOUND,MEDI-STRIP,1/2X4: Brand: MEDLINE

## (undated) DEVICE — SOLUTION SURG PREP POV IOD 7.5% 4 OZ